# Patient Record
Sex: MALE | Race: WHITE
[De-identification: names, ages, dates, MRNs, and addresses within clinical notes are randomized per-mention and may not be internally consistent; named-entity substitution may affect disease eponyms.]

---

## 2023-09-28 ENCOUNTER — HOSPITAL ENCOUNTER (OUTPATIENT)
Dept: HOSPITAL 95 - ORSCSDS | Age: 83
Discharge: HOME | End: 2023-09-28
Attending: OPHTHALMOLOGY
Payer: COMMERCIAL

## 2023-09-28 VITALS — SYSTOLIC BLOOD PRESSURE: 94 MMHG | DIASTOLIC BLOOD PRESSURE: 72 MMHG

## 2023-09-28 VITALS — WEIGHT: 129.19 LBS | BODY MASS INDEX: 19.13 KG/M2 | HEIGHT: 69 IN

## 2023-09-28 DIAGNOSIS — Z79.84: ICD-10-CM

## 2023-09-28 DIAGNOSIS — H25.13: ICD-10-CM

## 2023-09-28 DIAGNOSIS — I10: ICD-10-CM

## 2023-09-28 DIAGNOSIS — Z87.891: ICD-10-CM

## 2023-09-28 DIAGNOSIS — Z79.01: ICD-10-CM

## 2023-09-28 DIAGNOSIS — I48.91: ICD-10-CM

## 2023-09-28 DIAGNOSIS — J44.9: ICD-10-CM

## 2023-09-28 DIAGNOSIS — E11.36: Primary | ICD-10-CM

## 2023-09-28 DIAGNOSIS — Z79.899: ICD-10-CM

## 2023-09-28 PROCEDURE — V2632 POST CHMBR INTRAOCULAR LENS: HCPCS

## 2023-09-28 PROCEDURE — 08RK3JZ REPLACEMENT OF LEFT LENS WITH SYNTHETIC SUBSTITUTE, PERCUTANEOUS APPROACH: ICD-10-PCS | Performed by: OPHTHALMOLOGY

## 2023-10-24 ENCOUNTER — HOSPITAL ENCOUNTER (OUTPATIENT)
Dept: HOSPITAL 95 - ORSCSDS | Age: 83
Discharge: HOME | End: 2023-10-24
Attending: OPHTHALMOLOGY
Payer: COMMERCIAL

## 2023-10-24 VITALS — SYSTOLIC BLOOD PRESSURE: 108 MMHG | DIASTOLIC BLOOD PRESSURE: 78 MMHG

## 2023-10-24 VITALS — HEIGHT: 69 IN | WEIGHT: 129.41 LBS | BODY MASS INDEX: 19.17 KG/M2

## 2023-10-24 DIAGNOSIS — Z96.1: ICD-10-CM

## 2023-10-24 DIAGNOSIS — Z79.01: ICD-10-CM

## 2023-10-24 DIAGNOSIS — I10: ICD-10-CM

## 2023-10-24 DIAGNOSIS — E11.36: Primary | ICD-10-CM

## 2023-10-24 DIAGNOSIS — Z79.84: ICD-10-CM

## 2023-10-24 DIAGNOSIS — I48.91: ICD-10-CM

## 2023-10-24 DIAGNOSIS — Z79.899: ICD-10-CM

## 2023-10-24 DIAGNOSIS — I25.10: ICD-10-CM

## 2023-10-24 DIAGNOSIS — H25.11: ICD-10-CM

## 2023-10-24 PROCEDURE — V2632 POST CHMBR INTRAOCULAR LENS: HCPCS

## 2023-10-24 PROCEDURE — 08RJ3JZ REPLACEMENT OF RIGHT LENS WITH SYNTHETIC SUBSTITUTE, PERCUTANEOUS APPROACH: ICD-10-PCS | Performed by: OPHTHALMOLOGY

## 2023-10-24 NOTE — NUR
10/24/[address] [place], [place] [address] NOTIFIED OF SLIGHT STRIDOR SOUND HEARD DURING AUSCULTATION
BY [employer]. NO NEW ORDERS RECIEVED.